# Patient Record
Sex: FEMALE | Race: OTHER | HISPANIC OR LATINO | Employment: FULL TIME | URBAN - METROPOLITAN AREA
[De-identification: names, ages, dates, MRNs, and addresses within clinical notes are randomized per-mention and may not be internally consistent; named-entity substitution may affect disease eponyms.]

---

## 2020-03-11 ENCOUNTER — HOSPITAL ENCOUNTER (EMERGENCY)
Facility: HOSPITAL | Age: 40
Discharge: HOME | End: 2020-03-11
Attending: EMERGENCY MEDICINE | Admitting: EMERGENCY MEDICINE
Payer: COMMERCIAL

## 2020-03-11 VITALS
RESPIRATION RATE: 17 BRPM | OXYGEN SATURATION: 99 % | HEART RATE: 89 BPM | SYSTOLIC BLOOD PRESSURE: 147 MMHG | WEIGHT: 195 LBS | TEMPERATURE: 98.5 F | DIASTOLIC BLOOD PRESSURE: 91 MMHG

## 2020-03-11 DIAGNOSIS — J11.1 INFLUENZA: Primary | ICD-10-CM

## 2020-03-11 LAB
FLUAV RNA NPH QL NAA+PROBE: ABNORMAL
FLUBV RNA NPH QL NAA+PROBE: DETECTED
RSV RNA NPH QL NAA+PROBE: ABNORMAL
S PYO DNA THROAT QL NAA+PROBE: NORMAL

## 2020-03-11 PROCEDURE — 87631 RESP VIRUS 3-5 TARGETS: CPT | Performed by: PHYSICIAN ASSISTANT

## 2020-03-11 PROCEDURE — 99284 EMERGENCY DEPT VISIT MOD MDM: CPT | Performed by: PHYSICIAN ASSISTANT

## 2020-03-11 PROCEDURE — 99283 EMERGENCY DEPT VISIT LOW MDM: CPT

## 2020-03-11 PROCEDURE — 87651 STREP A DNA AMP PROBE: CPT | Performed by: PHYSICIAN ASSISTANT

## 2020-03-11 RX ORDER — IBUPROFEN 400 MG/1
400 TABLET ORAL EVERY 6 HOURS PRN
Qty: 20 TABLET | Refills: 0 | Status: SHIPPED | OUTPATIENT
Start: 2020-03-11

## 2020-03-11 RX ORDER — ACETAMINOPHEN 500 MG
500 TABLET ORAL EVERY 6 HOURS PRN
Qty: 30 TABLET | Refills: 0 | Status: SHIPPED | OUTPATIENT
Start: 2020-03-11

## 2020-03-11 RX ORDER — GUAIFENESIN/DEXTROMETHORPHAN 100-10MG/5
5 SYRUP ORAL 3 TIMES DAILY PRN
Qty: 118 ML | Refills: 0 | Status: SHIPPED | OUTPATIENT
Start: 2020-03-11 | End: 2020-03-21

## 2020-03-11 RX ORDER — LORATADINE 10 MG/1
10 TABLET ORAL DAILY
Qty: 30 TABLET | Refills: 0 | Status: SHIPPED | OUTPATIENT
Start: 2020-03-11

## 2020-03-11 NOTE — ED PROVIDER NOTES
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"  56-year-old female reports she is a  reports she began with nasal congestion, nonproductive cough and subjective fevers and body aches on Sunday, 4 days ago  Patient reports she did not receive the vaccine however denies any other medical conditions and reports she is not a smoker  Patient reports she was told she had to get checked for any viruses which she might past children  Patient reports no travel outside the country  Patient denies chest pain, abdominal pain, nausea, vomiting, diarrhea, shortness of breath, dysuria, hematuria or flank pain, diarrhea, constipation  History provided by:  Patient   used: No    Flu Symptoms   Presenting symptoms: cough, fatigue, headache, myalgias, rhinorrhea and sore throat    Presenting symptoms: no diarrhea, no fever, no nausea, no shortness of breath and no vomiting    Severity:  Moderate  Onset quality:  Gradual  Duration:  4 days  Progression:  Unchanged  Chronicity:  New  Relieved by:  None tried  Worsened by:  Nothing  Ineffective treatments:  None tried  Associated symptoms: chills, decreased appetite and nasal congestion    Associated symptoms: no decrease in physical activity, no ear pain, no mental status change, no neck stiffness and no syncope    Risk factors: sick contacts        None       History reviewed  No pertinent past medical history  History reviewed  No pertinent surgical history  History reviewed  No pertinent family history  I have reviewed and agree with the history as documented      E-Cigarette/Vaping     E-Cigarette/Vaping Substances     Social History     Tobacco Use    Smoking status: Never Smoker    Smokeless tobacco: Never Used   Substance Use Topics    Alcohol use: Never Frequency: Never    Drug use: Never       Review of Systems   Constitutional: Positive for chills, decreased appetite and fatigue  Negative for fever  HENT: Positive for congestion, rhinorrhea and sore throat  Negative for ear pain  Eyes: Negative for redness  Respiratory: Positive for cough  Negative for chest tightness and shortness of breath  Cardiovascular: Negative for chest pain and palpitations  Gastrointestinal: Negative for abdominal pain, diarrhea, nausea and vomiting  Genitourinary: Negative for dysuria and hematuria  Musculoskeletal: Positive for myalgias  Negative for neck stiffness  Skin: Negative for rash  Neurological: Positive for headaches  Negative for dizziness, syncope, light-headedness and numbness  Physical Exam  Physical Exam   Constitutional: She is oriented to person, place, and time  She appears well-developed and well-nourished  HENT:   Head: Normocephalic  Eyes: No scleral icterus  Cardiovascular: Normal rate and regular rhythm  Pulmonary/Chest: Effort normal and breath sounds normal  No stridor  Patient in no respiratory distress, speaking in full sentences, managing oral secretions without difficulty, no accessory muscle use, retractions, or belly breathing noted, no adventitious lung sounds auscultated bilaterally  Abdominal: Soft  She exhibits no distension  There is no tenderness  Musculoskeletal: Normal range of motion  Neurological: She is alert and oriented to person, place, and time  Skin: Skin is warm and dry  Capillary refill takes less than 2 seconds  Psychiatric: She has a normal mood and affect  Nursing note and vitals reviewed        Vital Signs  ED Triage Vitals [03/11/20 1824]   Temperature Pulse Respirations Blood Pressure SpO2   98 5 °F (36 9 °C) 89 17 147/91 99 %      Temp Source Heart Rate Source Patient Position - Orthostatic VS BP Location FiO2 (%)   Tympanic Monitor Sitting Left arm --      Pain Score       -- Vitals:    03/11/20 1824   BP: 147/91   Pulse: 89   Patient Position - Orthostatic VS: Sitting         Visual Acuity      ED Medications  Medications - No data to display    Diagnostic Studies  Results Reviewed     Procedure Component Value Units Date/Time    Influenza A/B and RSV PCR [[id number]]  (Abnormal) Collected:  03/11/20 1904    Lab Status:  Final result Specimen:  Nasopharyngeal from Nose Updated:  03/11/20 1956     INFLUENZA A PCR None Detected     INFLUENZA B PCR Detected     RSV PCR None Detected    Strep A PCR [[id number]]  (Normal) Collected:  03/11/20 1904    Lab Status:  Final result Specimen:  Throat Updated:  03/11/20 1951     STREP A PCR None Detected                 No orders to display              Procedures  Procedures         ED Course  ED Course as of Mar 11 2008   Wed Mar 11, 2020   2007 Patient was reexamined at this time and informed of laboratory and/or imaging results and was found to be stable for discharge  Return to emergency department criteria was reviewed with the patient who verbalized understanding and was agreeable to discharge and the treatment plan at this time  INFLU B PCR(!): Detected                                 MDM  Number of Diagnoses or Management Options  Diagnosis management comments: Previously healthy 43-year-old female on vaccinated for influenza presents for evaluation of a nonproductive cough, body aches, headaches, nasal congestion which began Sunday,4 days ago  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  and was told she needed to be tested for communicable diseases before returning to work  Will obtain a strep and influenza testing  Patient is very well-appearing on exam likely will only need symptomatic treatment including Tylenol, Claritin and cough suppressants          Disposition  Final diagnoses:   Influenza     Time reflects when diagnosis was documented in both MDM as applicable and the Disposition within this note     Time User Action Codes Description Comment    3/11/2020  8:07 PM Katina Pérez - Selena Kaplan [J11 1] Influenza       ED Disposition     ED Disposition Condition Date/Time Comment    Discharge Stable Wed Mar 11, 2020  8:07 PM Vandana Kirsty discharge to home/self care  Follow-up Information     Follow up With Specialties Details Why Deepika Alonso MD Family Medicine Schedule an appointment as soon as possible for a visit  As needed [address] [facility]             Patient's Medications   Discharge Prescriptions    ACETAMINOPHEN (TYLENOL) 500 MG TABLET    Take 1 tablet (500 mg total) by mouth every 6 (six) hours as needed for moderate pain       Start Date: 3/11/2020 End Date: --       Order Dose: 500 mg       Quantity: 30 tablet    Refills: 0    DEXTROMETHORPHAN-GUAIFENESIN (ROBITUSSIN DM)  MG/5 ML SYRUP    Take 5 mL by mouth 3 (three) times a day as needed for cough for up to 10 days       Start Date: 3/11/2020 End Date: 3/21/2020       Order Dose: 5 mL       Quantity: 118 mL    Refills: 0    IBUPROFEN (MOTRIN) 400 MG TABLET    Take 1 tablet (400 mg total) by mouth every 6 (six) hours as needed for moderate pain       Start Date: 3/11/2020 End Date: --       Order Dose: 400 mg       Quantity: 20 tablet    Refills: 0    LORATADINE (CLARITIN) 10 MG TABLET    Take 1 tablet (10 mg total) by mouth daily       Start Date: 3/11/2020 End Date: --       Order Dose: 10 mg       Quantity: 30 tablet    Refills: 0     No discharge procedures on file      PDMP Review     None          ED Provider  Electronically Signed by           Khang Rutledge PA-C  03/11/20 2008

## 2023-05-17 ENCOUNTER — OFFICE VISIT (OUTPATIENT)
Dept: FAMILY MEDICINE CLINIC | Facility: CLINIC | Age: 43
End: 2023-05-17

## 2023-05-17 VITALS
HEART RATE: 89 BPM | RESPIRATION RATE: 16 BRPM | SYSTOLIC BLOOD PRESSURE: 122 MMHG | OXYGEN SATURATION: 98 % | WEIGHT: 199 LBS | TEMPERATURE: 97.5 F | DIASTOLIC BLOOD PRESSURE: 78 MMHG | HEIGHT: 65 IN | BODY MASS INDEX: 33.15 KG/M2

## 2023-05-17 DIAGNOSIS — B35.1 ONYCHOMYCOSIS: ICD-10-CM

## 2023-05-17 DIAGNOSIS — L30.9 ECZEMA OF FACE: ICD-10-CM

## 2023-05-17 DIAGNOSIS — Z12.31 ENCOUNTER FOR SCREENING MAMMOGRAM FOR BREAST CANCER: ICD-10-CM

## 2023-05-17 DIAGNOSIS — Z76.89 ENCOUNTER TO ESTABLISH CARE: Primary | ICD-10-CM

## 2023-05-17 DIAGNOSIS — E78.5 HYPERLIPIDEMIA, UNSPECIFIED HYPERLIPIDEMIA TYPE: ICD-10-CM

## 2023-05-17 DIAGNOSIS — M25.50 ARTHRALGIA, UNSPECIFIED JOINT: ICD-10-CM

## 2023-05-17 DIAGNOSIS — Z11.4 SCREENING FOR HIV (HUMAN IMMUNODEFICIENCY VIRUS): ICD-10-CM

## 2023-05-17 DIAGNOSIS — Z11.59 NEED FOR HEPATITIS C SCREENING TEST: ICD-10-CM

## 2023-05-17 RX ORDER — GABAPENTIN 100 MG/1
100 CAPSULE ORAL 3 TIMES DAILY
Qty: 90 CAPSULE | Refills: 2 | Status: SHIPPED | OUTPATIENT
Start: 2023-05-17 | End: 2023-08-15

## 2023-05-17 RX ORDER — TRIAMCINOLONE ACETONIDE 0.25 MG/G
OINTMENT TOPICAL 2 TIMES DAILY
Qty: 30 G | Refills: 0 | Status: SHIPPED | OUTPATIENT
Start: 2023-05-17 | End: 2023-05-17

## 2023-05-17 RX ORDER — TRIAMCINOLONE ACETONIDE 1 MG/G
CREAM TOPICAL 2 TIMES DAILY
Qty: 30 G | Refills: 0 | Status: SHIPPED | OUTPATIENT
Start: 2023-05-17

## 2023-05-17 NOTE — PROGRESS NOTES
Name: Judith Rutledge      : 1980      MRN: [id number]  Encounter Provider: HAILE Foster  Encounter Date: 2023   Encounter department: [address] [place] [address]     1  Encounter to establish care    2  BMI 33 0-33 9,adult  -     CBC and differential; Future  -     Comprehensive metabolic panel; Future  -     Hemoglobin A1C; Future  -     Lipid panel; Future  -     TSH, 3rd generation with Free T4 reflex; Future  -     Ambulatory Referral to Weight Management; Future    3  Arthralgia, unspecified joint  Assessment & Plan:  Patient reports daily multiple joint pain worse in the AM  Most bothersome in her blt knees with intermittent inflammation  Exam is unremarkable -discussed with patient will obtain inflammatory marker labs r/o autoimmune etiology if initial test are positive will refer to rheumatology   Discussed weight loss benefits   Start gabapentin 100 mg tid, Diclofenac 50 mg bid     Orders:  -     RF Screen w/ Reflex to Titer; Future  -     Cyclic citrul peptide antibody, IgG; Future  -     SIMÓN Screen w/ Reflex to Titer/Pattern; Future  -     C-reactive protein; Future  -     gabapentin (Neurontin) 100 mg capsule; Take 1 capsule (100 mg total) by mouth 3 (three) times a day  -     diclofenac sodium (VOLTAREN) 50 mg EC tablet; Take 1 tablet (50 mg total) by mouth 2 (two) times a day  -     XR knee 3 vw left non injury; Future; Expected date: 2023  -     XR knee 3 vw right non injury; Future; Expected date: 2023    4  Need for hepatitis C screening test  -     Hepatitis C Antibody; Future    5  Screening for HIV (human immunodeficiency virus)  -     HIV 1/2 AG/AB w Reflex [facility] for 2 yr old and above; Future    6  Encounter for screening mammogram for breast cancer  -     Mammo screening bilateral w 3d & cad; Future; Expected date: 2023    7   Eczema of face  Assessment & Plan:  Reports dry scaly pruritic rash on face for a few weeks - no change in soap/lotion   -Advised patient to continue applying OTC lotion such as aveeno or eucerin twice daily and also immediately after showers  - Advised to take fast showers with warm water (not hot)  Advised to pat skin dry rather than rub  Advised to use [place], [place], or BASIS soap products or anything without scents  - 1  Fast showers with warm not hot water  pat dry not rub   2  Apply good moisturizer immediately afer showers and bid : I like [employer] Ultimate products : cheap and good   4  Dove? [place] or BASIS soap products are best : nothing with scent or deodorants   5 No fabric softenenrs     Start triamcinolone bid for 7 days     Orders:  -     triamcinolone (KENALOG) 0 1 % cream; Apply topically 2 (two) times a day    8  Onychomycosis  Assessment & Plan:  Patient reports ongoing nail discoloration differential fungal in origin   Start penlac 8% nightly    Orders:  -     ciclopirox (PENLAC) 8 % solution; Apply topically daily at bedtime    9  Hyperlipidemia, unspecified hyperlipidemia type  Assessment & Plan:  History or hyperlipedemia   - Advised patient to exercisefor at least 30 minutes a day for at least 5 days a week  - Advised patient to follow a low-carbohydrate diet and to eat carbohydrates that are whole grain     - Advised to try to avoid/limit the daily intake of unhealthy fat such as vegetable oil, canola oil, and peanut oil  You can often find these oils within foods such as cookies, cakes, chips  - Advised to increase foods with healthy fats such as avocados, beans, legumes, nuts, tuna, salmon, whole grains  Lipid future       BMI Counseling: Body mass index is 33 12 kg/m²   The BMI is above normal  Nutrition recommendations include decreasing portion sizes, encouraging healthy choices of fruits and vegetables, decreasing fast food intake, consuming healthier snacks, limiting drinks that contain sugar, moderation in carbohydrate intake, increasing intake of lean protein, reducing intake of saturated and trans fat and reducing intake of cholesterol  Exercise recommendations include exercising 3-5 times per week  Pharmacotherapy was ordered to help aid in weight loss  Patient referred to weight management  Rationale for BMI follow-up plan is due to patient being overweight or obese  Depression Screening and Follow-up Plan: Patient was screened for depression during today's encounter  They screened negative with a PHQ-2 score of 0  Amarilys Paiz 39 y o  female  has no past medical history on file  Presenting today to [facility]  Patient experiencing multiple joint pain in blt hands, fingers, knees, ankles, back for over 4 months  Patient today is well appearing Denies fatigue, headaches, dizziness, blurred vision, nausea, palpitation, chest pain, SOB, urinary changes, weakness, bowel changes, sleep problems,  sick contacts, red flag signs,  or recent travel             Generalized Body Aches  The current episode started more than 1 month ago  The problem occurs daily  The problem has been waxing and waning since onset  Exacerbated by: lying at night  Associated symptoms include joint pain and a rash  Pertinent negatives include no congestion, ear pain, eye pain, sore throat, fever, chest pain, coughing, shortness of breath, abdominal pain or vomiting  Past treatments include acetaminophen (NSAIDS)  Urine output has been normal  There were no sick contacts  Rash  This is a new problem  The current episode started 1 to 4 weeks ago  The problem is unchanged  The affected locations include the face  The problem is mild  The rash is characterized by dryness, scaling and itchiness  It is unknown if there was an exposure to a precipitant  Associated symptoms include joint pain  Pertinent negatives include no congestion, cough, decreased physical activity, decreased responsiveness, fever, shortness of breath, sore throat or vomiting   Past treatments include "nothing  The treatment provided no relief  Review of Systems   Constitutional: Negative for chills, decreased responsiveness and fever  HENT: Negative for congestion, ear pain and sore throat  Eyes: Negative for pain and visual disturbance  Respiratory: Negative for cough and shortness of breath  Cardiovascular: Negative for chest pain and palpitations  Gastrointestinal: Negative for abdominal pain and vomiting  Genitourinary: Negative for dysuria and hematuria  Musculoskeletal: Positive for arthralgias and joint pain  Negative for back pain  Skin: Positive for rash  Negative for color change  Neurological: Negative for seizures and syncope  All other systems reviewed and are negative  Current Outpatient Medications on File Prior to Visit   Medication Sig   • [DISCONTINUED] acetaminophen (TYLENOL) 500 mg tablet Take 1 tablet (500 mg total) by mouth every 6 (six) hours as needed for moderate pain (Patient not taking: Reported on 5/17/2023)   • [DISCONTINUED] ibuprofen (MOTRIN) 400 mg tablet Take 1 tablet (400 mg total) by mouth every 6 (six) hours as needed for moderate pain (Patient not taking: Reported on 5/17/2023)   • [DISCONTINUED] loratadine (CLARITIN) 10 mg tablet Take 1 tablet (10 mg total) by mouth daily (Patient not taking: Reported on 5/17/2023)       Objective     /78 (BP Location: Left arm, Patient Position: Sitting, Cuff Size: Large)   Pulse 89   Temp 97 5 °F (36 4 °C) (Temporal)   Resp 16   Ht 5' 5\" (1 651 m)   Wt 90 3 kg (199 lb)   LMP 04/30/2023 (Approximate)   SpO2 98%   BMI 33 12 kg/m²     Physical Exam  Vitals and nursing note reviewed  Constitutional:       General: She is not in acute distress  Appearance: Normal appearance  She is not ill-appearing  HENT:      Head: Normocephalic and atraumatic        Right Ear: Tympanic membrane, ear canal and external ear normal       Left Ear: Tympanic membrane, ear canal and external ear normal       Nose: " Nose normal       Mouth/Throat:      Mouth: Mucous membranes are moist    Eyes:      General:         Right eye: No discharge  Left eye: No discharge  Pupils: Pupils are equal, round, and reactive to light  Cardiovascular:      Rate and Rhythm: Normal rate and regular rhythm  Pulses: Normal pulses  Heart sounds: Normal heart sounds  Pulmonary:      Effort: Pulmonary effort is normal  No respiratory distress  Breath sounds: Normal breath sounds  No wheezing  Abdominal:      General: Bowel sounds are normal       Palpations: Abdomen is soft  Tenderness: There is no abdominal tenderness  There is no right CVA tenderness or left CVA tenderness  Musculoskeletal:         General: Normal range of motion  Cervical back: Normal range of motion  Right lower leg: No edema  Left lower leg: No edema  Skin:     General: Skin is warm and dry  Findings: Rash present  Rash is scaling  Neurological:      General: No focal deficit present  Mental Status: She is alert and oriented to person, place, and time         HAILE Yung

## 2023-05-17 NOTE — ASSESSMENT & PLAN NOTE
Patient reports daily multiple joint pain worse in the AM  Most bothersome in her blt knees with intermittent inflammation     Exam is unremarkable -discussed with patient will obtain inflammatory marker labs r/o autoimmune etiology if initial test are positive will refer to rheumatology   Discussed weight loss benefits   Start gabapentin 100 mg tid, Diclofenac 50 mg bid

## 2023-05-17 NOTE — ASSESSMENT & PLAN NOTE
History or hyperlipedemia   - Advised patient to exercisefor at least 30 minutes a day for at least 5 days a week  - Advised patient to follow a low-carbohydrate diet and to eat carbohydrates that are whole grain     - Advised to try to avoid/limit the daily intake of unhealthy fat such as vegetable oil, canola oil, and peanut oil  You can often find these oils within foods such as cookies, cakes, chips  - Advised to increase foods with healthy fats such as avocados, beans, legumes, nuts, tuna, salmon, whole grains      Lipid future

## 2023-05-17 NOTE — ASSESSMENT & PLAN NOTE
Reports dry scaly pruritic rash on face for a few weeks - no change in soap/lotion   -Advised patient to continue applying OTC lotion such as aveeno or eucerin twice daily and also immediately after showers  - Advised to take fast showers with warm water (not hot)  Advised to pat skin dry rather than rub  Advised to use [place], [place], or [employer] soap products or anything without scents  - 1  Fast showers with warm not hot water  pat dry not rub   2  Apply good moisturizer immediately afer showers and bid : I like [employer] Ultimate products : cheap and good   4  Dove?  [place] or BASIS soap products are best : nothing with scent or deodorants   5 No fabric softenenrs     Start triamcinolone bid for 7 days

## 2023-05-18 ENCOUNTER — OFFICE VISIT (OUTPATIENT)
Dept: OBGYN CLINIC | Facility: CLINIC | Age: 43
End: 2023-05-18

## 2023-05-18 ENCOUNTER — APPOINTMENT (OUTPATIENT)
Dept: LAB | Facility: CLINIC | Age: 43
End: 2023-05-18

## 2023-05-18 VITALS
WEIGHT: 197.4 LBS | HEIGHT: 65 IN | HEART RATE: 73 BPM | DIASTOLIC BLOOD PRESSURE: 73 MMHG | BODY MASS INDEX: 32.89 KG/M2 | SYSTOLIC BLOOD PRESSURE: 134 MMHG

## 2023-05-18 DIAGNOSIS — M25.50 ARTHRALGIA, UNSPECIFIED JOINT: ICD-10-CM

## 2023-05-18 DIAGNOSIS — Z01.419 ENCOUNTER FOR ANNUAL ROUTINE GYNECOLOGICAL EXAMINATION: Primary | ICD-10-CM

## 2023-05-18 DIAGNOSIS — Z11.59 NEED FOR HEPATITIS C SCREENING TEST: ICD-10-CM

## 2023-05-18 DIAGNOSIS — Z11.4 SCREENING FOR HIV (HUMAN IMMUNODEFICIENCY VIRUS): ICD-10-CM

## 2023-05-18 LAB
ALBUMIN SERPL BCP-MCNC: 3.8 G/DL (ref 3.5–5)
ALP SERPL-CCNC: 70 U/L (ref 46–116)
ALT SERPL W P-5'-P-CCNC: 22 U/L (ref 12–78)
ANA SER QL IA: NEGATIVE
ANION GAP SERPL CALCULATED.3IONS-SCNC: 2 MMOL/L (ref 4–13)
AST SERPL W P-5'-P-CCNC: 18 U/L (ref 5–45)
BASOPHILS # BLD AUTO: 0.03 THOUSANDS/ÂΜL (ref 0–0.1)
BASOPHILS NFR BLD AUTO: 1 % (ref 0–1)
BILIRUB SERPL-MCNC: 0.42 MG/DL (ref 0.2–1)
BUN SERPL-MCNC: 20 MG/DL (ref 5–25)
CALCIUM SERPL-MCNC: 8.9 MG/DL (ref 8.3–10.1)
CHLORIDE SERPL-SCNC: 111 MMOL/L (ref 96–108)
CHOLEST SERPL-MCNC: 242 MG/DL
CO2 SERPL-SCNC: 23 MMOL/L (ref 21–32)
CREAT SERPL-MCNC: 0.82 MG/DL (ref 0.6–1.3)
CRP SERPL QL: 11.6 MG/L
EOSINOPHIL # BLD AUTO: 0.11 THOUSAND/ÂΜL (ref 0–0.61)
EOSINOPHIL NFR BLD AUTO: 2 % (ref 0–6)
ERYTHROCYTE [DISTWIDTH] IN BLOOD BY AUTOMATED COUNT: 12.7 % (ref 11.6–15.1)
GFR SERPL CREATININE-BSD FRML MDRD: 87 ML/MIN/1.73SQ M
GLUCOSE P FAST SERPL-MCNC: 85 MG/DL (ref 65–99)
HCT VFR BLD AUTO: 39 % (ref 34.8–46.1)
HCV AB SER QL: NORMAL
HDLC SERPL-MCNC: 45 MG/DL
HGB BLD-MCNC: 12.8 G/DL (ref 11.5–15.4)
HIV 1+2 AB+HIV1 P24 AG SERPL QL IA: NORMAL
HIV 2 AB SERPL QL IA: NORMAL
HIV1 AB SERPL QL IA: NORMAL
HIV1 P24 AG SERPL QL IA: NORMAL
IMM GRANULOCYTES # BLD AUTO: 0.01 THOUSAND/UL (ref 0–0.2)
IMM GRANULOCYTES NFR BLD AUTO: 0 % (ref 0–2)
LDLC SERPL CALC-MCNC: 152 MG/DL (ref 0–100)
LYMPHOCYTES # BLD AUTO: 1.98 THOUSANDS/ÂΜL (ref 0.6–4.47)
LYMPHOCYTES NFR BLD AUTO: 37 % (ref 14–44)
MCH RBC QN AUTO: 30.4 PG (ref 26.8–34.3)
MCHC RBC AUTO-ENTMCNC: 32.8 G/DL (ref 31.4–37.4)
MCV RBC AUTO: 93 FL (ref 82–98)
MONOCYTES # BLD AUTO: 0.48 THOUSAND/ÂΜL (ref 0.17–1.22)
MONOCYTES NFR BLD AUTO: 9 % (ref 4–12)
NEUTROPHILS # BLD AUTO: 2.7 THOUSANDS/ÂΜL (ref 1.85–7.62)
NEUTS SEG NFR BLD AUTO: 51 % (ref 43–75)
NONHDLC SERPL-MCNC: 197 MG/DL
NRBC BLD AUTO-RTO: 0 /100 WBCS
PLATELET # BLD AUTO: 232 THOUSANDS/UL (ref 149–390)
PMV BLD AUTO: 12 FL (ref 8.9–12.7)
POTASSIUM SERPL-SCNC: 4.1 MMOL/L (ref 3.5–5.3)
PROT SERPL-MCNC: 7.7 G/DL (ref 6.4–8.4)
RBC # BLD AUTO: 4.21 MILLION/UL (ref 3.81–5.12)
SODIUM SERPL-SCNC: 136 MMOL/L (ref 135–147)
TRIGL SERPL-MCNC: 227 MG/DL
TSH SERPL DL<=0.05 MIU/L-ACNC: 3.41 UIU/ML (ref 0.45–4.5)
WBC # BLD AUTO: 5.31 THOUSAND/UL (ref 4.31–10.16)

## 2023-05-18 RX ORDER — UREA 10 %
1 LOTION (ML) TOPICAL DAILY
COMMUNITY
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
## 2023-05-18 NOTE — PATIENT INSTRUCTIONS
PAP results can take up to 2 weeks  Call with needs or concerns  Return in 1 year      Tereza Montero

## 2023-05-19 ENCOUNTER — HOSPITAL ENCOUNTER (OUTPATIENT)
Dept: RADIOLOGY | Facility: HOSPITAL | Age: 43
End: 2023-05-19

## 2023-05-19 DIAGNOSIS — M25.50 ARTHRALGIA, UNSPECIFIED JOINT: ICD-10-CM

## 2023-05-19 LAB
CCP AB SER IA-ACNC: 0.8
EST. AVERAGE GLUCOSE BLD GHB EST-MCNC: 103 MG/DL
HBA1C MFR BLD: 5.2 %
RHEUMATOID FACT SER QL LA: NEGATIVE

## 2023-05-22 LAB
HPV HR 12 DNA CVX QL NAA+PROBE: NEGATIVE
HPV16 DNA CVX QL NAA+PROBE: NEGATIVE
HPV18 DNA CVX QL NAA+PROBE: NEGATIVE

## 2023-05-22 NOTE — RESULT ENCOUNTER NOTE
Mild elevation of CRP (shows inflammation) neg for autoimmune etiology   CBC -normal no signs of anemia   CMP-Liver/kidney function normal   Thyroid -normal   A1C(diabetes) -5 2 normal   Cholesterol -elevated   - Advised patient to exercisefor at least 30 minutes a day for at least 5 days a week     - Advised patient to follow a low-carbohydrate diet and to eat carbohydrates that are whole grain     - Advised to try to avoid/limit the daily intake of unhealthy fat such as vegetable oil, canola oil, and peanut oil  You can often find these oils within foods such as cookies, cakes, chips  - Advised to increase foods with healthy fats such as avocados, beans, legumes, nuts, tuna, salmon, whole grains  ;

## 2023-05-26 LAB
LAB AP GYN PRIMARY INTERPRETATION: NORMAL
Lab: NORMAL

## 2023-06-01 ENCOUNTER — HOSPITAL ENCOUNTER (OUTPATIENT)
Dept: MAMMOGRAPHY | Facility: CLINIC | Age: 43
End: 2023-06-01
Payer: COMMERCIAL

## 2023-06-01 VITALS — WEIGHT: 197 LBS | BODY MASS INDEX: 32.82 KG/M2 | HEIGHT: 65 IN

## 2023-06-01 DIAGNOSIS — Z12.31 ENCOUNTER FOR SCREENING MAMMOGRAM FOR BREAST CANCER: ICD-10-CM

## 2023-06-01 PROCEDURE — 77063 BREAST TOMOSYNTHESIS BI: CPT

## 2023-06-01 PROCEDURE — 77067 SCR MAMMO BI INCL CAD: CPT

## 2023-08-15 ENCOUNTER — TELEPHONE (OUTPATIENT)
Dept: FAMILY MEDICINE CLINIC | Facility: CLINIC | Age: 43
End: 2023-08-15

## 2023-08-15 NOTE — TELEPHONE ENCOUNTER
left message for pt to call back to reschedule the 07/5 appt ( Provider ) also advise appt would be canceled - will try again one more time

## 2023-08-17 ENCOUNTER — OFFICE VISIT (OUTPATIENT)
Dept: FAMILY MEDICINE CLINIC | Facility: CLINIC | Age: 43
End: 2023-08-17

## 2023-08-17 VITALS
DIASTOLIC BLOOD PRESSURE: 70 MMHG | HEIGHT: 65 IN | TEMPERATURE: 98.5 F | SYSTOLIC BLOOD PRESSURE: 120 MMHG | OXYGEN SATURATION: 97 % | RESPIRATION RATE: 16 BRPM | HEART RATE: 97 BPM | BODY MASS INDEX: 33.66 KG/M2 | WEIGHT: 202 LBS

## 2023-08-17 DIAGNOSIS — L30.9 ECZEMA OF FACE: ICD-10-CM

## 2023-08-17 DIAGNOSIS — B35.1 ONYCHOMYCOSIS: ICD-10-CM

## 2023-08-17 DIAGNOSIS — M25.50 ARTHRALGIA, UNSPECIFIED JOINT: ICD-10-CM

## 2023-08-17 DIAGNOSIS — N39.46 MIXED STRESS AND URGE INCONTINENCE: Primary | ICD-10-CM

## 2023-08-17 PROCEDURE — 99213 OFFICE O/P EST LOW 20 MIN: CPT

## 2023-08-17 NOTE — ASSESSMENT & PLAN NOTE
- Recommend kegel exercises & lifestyle management. Declined pelvic therapy. F/u obgyn if no improvement.

## 2023-08-17 NOTE — PROGRESS NOTES
Name: Carissa Sin      : 1980      MRN: [id number]  Encounter Provider: HAILE Sanchez  Encounter Date: 2023   Encounter department: [address] [facility]     1. Mixed stress and urge incontinence  Assessment & Plan:  - Recommend kegel exercises & lifestyle management. Declined pelvic therapy. F/u obgyn if no improvement. 2. Onychomycosis  Assessment & Plan:  - continue penlac. Advised that it can take up to 6 months to resolve      3. Eczema of face  Assessment & Plan:  - resolved, stopped kenalog      4. Arthralgia, unspecified joint  Assessment & Plan:  Resolved. Stopped gabapentin & diclofenac. Subjective      Carissa Sin is a 39 y.o. female  has a past medical history of Abnormal Pap smear of cervix. has a past surgical history that includes Tubal ligation. Carissa Sin is a 39 y.o. female who presents for evaluation of urinary incontinence. This has been present for 2 months. She leaks urine with coughing, laughing, sneezing, with urge. Patient describes the symptoms as urge to urinate with little or no warning. Factors associated with symptoms include: none known. Evaluation to date includes none. Treatment to date includes none. Review of Systems   Constitutional: Negative for chills, fatigue, fever and unexpected weight change. HENT: Negative for congestion, ear pain and sore throat. Eyes: Negative for pain and visual disturbance. Respiratory: Negative for cough, chest tightness and shortness of breath. Cardiovascular: Negative for chest pain and palpitations. Gastrointestinal: Negative for abdominal pain, constipation, diarrhea, nausea and vomiting. Genitourinary: Positive for urgency. Negative for dysuria and hematuria. Urinary incontinence   Musculoskeletal: Negative for arthralgias and myalgias. Skin: Negative for color change and rash.    Neurological: Negative for dizziness, light-headedness and headaches. Psychiatric/Behavioral: Negative for confusion. Current Outpatient Medications on File Prior to Visit   Medication Sig   • calcium carbonate (OS-BEATRICE) 1250 (500 Ca) MG chewable tablet Chew 1 tablet daily (Patient not taking: Reported on 8/17/2023)   • ciclopirox (PENLAC) 8 % solution Apply topically daily at bedtime (Patient not taking: Reported on 8/17/2023)   • [DISCONTINUED] diclofenac sodium (VOLTAREN) 50 mg EC tablet Take 1 tablet (50 mg total) by mouth 2 (two) times a day   • [DISCONTINUED] gabapentin (Neurontin) 100 mg capsule Take 1 capsule (100 mg total) by mouth 3 (three) times a day   • [DISCONTINUED] triamcinolone (KENALOG) 0.1 % cream Apply topically 2 (two) times a day (Patient not taking: Reported on 8/17/2023)       Objective     /70 (BP Location: Right arm, Patient Position: Sitting, Cuff Size: Large)   Pulse 97   Temp 98.5 °F (36.9 °C) (Temporal)   Resp 16   Ht 5' 5" (1.651 m)   Wt 91.6 kg (202 lb)   LMP 07/22/2023 (Approximate)   SpO2 97%   Breastfeeding No   BMI 33.61 kg/m²     Physical Exam  Vitals reviewed. Constitutional:       Appearance: Normal appearance. She is obese. HENT:      Head: Normocephalic and atraumatic. Right Ear: External ear normal.      Left Ear: External ear normal.      Nose: Nose normal.      Mouth/Throat:      Mouth: Mucous membranes are moist.   Eyes:      Conjunctiva/sclera: Conjunctivae normal.   Cardiovascular:      Rate and Rhythm: Normal rate and regular rhythm. Heart sounds: Normal heart sounds. No murmur heard. No gallop. Pulmonary:      Effort: Pulmonary effort is normal.      Breath sounds: Normal breath sounds. Abdominal:      General: Abdomen is flat. Bowel sounds are normal.      Palpations: Abdomen is soft. Skin:     General: Skin is warm and dry. Neurological:      Mental Status: She is alert and oriented to person, place, and time.    Psychiatric:         Mood and Affect: Mood normal.         Behavior: Behavior normal.         Thought Content:  Thought content normal.         Judgment: Judgment normal.       Tatum Cantrell

## 2023-08-24 ENCOUNTER — CONSULT (OUTPATIENT)
Dept: BARIATRICS | Facility: CLINIC | Age: 43
End: 2023-08-24
Payer: COMMERCIAL

## 2023-08-24 VITALS
BODY MASS INDEX: 33.87 KG/M2 | DIASTOLIC BLOOD PRESSURE: 70 MMHG | SYSTOLIC BLOOD PRESSURE: 124 MMHG | HEART RATE: 85 BPM | OXYGEN SATURATION: 99 % | HEIGHT: 64 IN | TEMPERATURE: 98.2 F | WEIGHT: 198.4 LBS | RESPIRATION RATE: 20 BRPM

## 2023-08-24 DIAGNOSIS — E78.5 HYPERLIPIDEMIA, UNSPECIFIED HYPERLIPIDEMIA TYPE: ICD-10-CM

## 2023-08-24 DIAGNOSIS — R63.5 ABNORMAL WEIGHT GAIN: Primary | ICD-10-CM

## 2023-08-24 DIAGNOSIS — E66.9 CLASS 1 OBESITY: ICD-10-CM

## 2023-08-24 PROCEDURE — 99204 OFFICE O/P NEW MOD 45 MIN: CPT | Performed by: PHYSICIAN ASSISTANT

## 2023-08-24 RX ORDER — TOPIRAMATE 50 MG/1
TABLET, FILM COATED ORAL
Qty: 30 TABLET | Refills: 1 | Status: SHIPPED | OUTPATIENT
Start: 2023-08-24

## 2023-08-24 NOTE — ASSESSMENT & PLAN NOTE
-Discussed options of Healthy[facility]-Intensive Lifestyle Intervention Program, Very Low Calorie Diet-VLCD and Conservative Program and the role of weight loss medications.  -Initial weight loss goal of 5-10% weight loss for improved health  -Screening labs: reviewed CMP, Lipid panel, TSH, HgbA1c  -Patient is interested in pursuing conservative program  -Calorie goals, sample menu, portion size guidelines, and food logging reviewed with the patient.   -Patient has struggled in past with eliminating soda. Discussed importance of avoiding sugary sweetened beverages to achieve weight loss. Can trial Topamax. SE profiled reviewed.  Patient instructed to call office with questions or concerns while taking Topamax  -Denies hx glaucoma or kidney stones  -Medication agreement signed  -Contraception: Tubal ligation    Negative Stop-Bang

## 2023-08-24 NOTE — PATIENT INSTRUCTIONS
Goals: Food log (ie.) [email or web address],[email or web address] com,etc.   No sugary beverages. At least 64oz of water daily. Increase physical activity by 10 minutes daily.  Gradually increase physical activity to a goal of 5 days per week for 30 minutes of MODERATE intensity PLUS 2 days per week of FULL BODY resistance training  6783-8995 calories per day  5-10 servings of fruits and vegetables per day  80 grams of protein per day

## 2023-08-24 NOTE — PROGRESS NOTES
Assessment/Plan:    Class 1 obesity  -Discussed options of [facility]-Intensive Lifestyle Intervention Program, Very Low Calorie Diet-VLCD and Conservative Program and the role of weight loss medications.  -Initial weight loss goal of 5-10% weight loss for improved health  -Screening labs: reviewed CMP, Lipid panel, TSH, HgbA1c  -Patient is interested in pursuing conservative program  -Calorie goals, sample menu, portion size guidelines, and food logging reviewed with the patient.   -Patient has struggled in past with eliminating soda. Discussed importance of avoiding sugary sweetened beverages to achieve weight loss. Can trial Topamax. SE profiled reviewed. Patient instructed to call office with questions or concerns while taking Topamax  -Denies hx glaucoma or kidney stones  -Medication agreement signed  -Contraception: Tubal ligation    Negative Stop-Bang    Hyperlipidemia  -can improve with dietary changes and regular cardiovascular exercise    Goals:    Food log (ie.) [email or web address],etc.   No sugary beverages. At least 64oz of water daily. Increase physical activity by 10 minutes daily. Gradually increase physical activity to a goal of 5 days per week for 30 minutes of MODERATE intensity PLUS 2 days per week of FULL BODY resistance training  1491-7676 calories per day  5-10 servings of fruits and vegetables per day  80 grams of protein per day    Follow up in approximately 2 months with Non-Surgical Physician/Advanced Practitioner. Diagnoses and all orders for this visit:    Abnormal weight gain  Comments:  see plan under Class 1 obesity    Class 1 obesity  -     Ambulatory Referral to Weight Management  -     topiramate (Topamax) 50 MG tablet;  Take 1/2 tab po HS for 1 week and then increase to 1 tab po HS    Hyperlipidemia, unspecified hyperlipidemia type          Subjective:   Chief Complaint   Patient presents with   • Consult       Patient ID: Valerio Patelpiter Jay Jay Hillman  is a 39 y.o. female with excess weight/obesity here to pursue weight managment. Past Medical History:   Diagnosis Date   • Abnormal Pap smear of cervix          HPI:  Obesity/Excess Weight:  Severity: Mild  Onset:  Around 2014   Modifiers: reducing portion sizes, self created diets, Phentermine and Topamax combo but reports it made her very emotional and tearful (never took them individually)  Contributing factors: Poor Food Choices and Stress/Emotional Eating  Associated symptoms: fatigue and increased joint pain, doesn't feel comfortable    Goals: Lose 30-35 lbs  Highest: 200 lbs    Hydration: water 2-3 bottles, 1 cup of coffee + flavored creamer, Regular soda 2-4 cans of soda per day, iced tea  ETOH: denies  Exercise: denies  Occupation:   SLeep: 8 hours  Smoking: denies      Colonoscopy: N/A    The following portions of the patient's history were reviewed and updated as appropriate: allergies, current medications, past family history, past medical history, past social history, past surgical history and problem list.    Review of Systems   Constitutional: Negative for chills and fever. HENT: Negative for sore throat. Respiratory: Negative for cough and shortness of breath. Cardiovascular: Negative for chest pain and palpitations. Gastrointestinal: Negative for abdominal pain, nausea and vomiting. Genitourinary: Negative for dysuria. Musculoskeletal: Positive for arthralgias. Skin: Negative for rash. Neurological: Negative for dizziness and headaches. Psychiatric/Behavioral: Negative for dysphoric mood. Objective:    /70 (BP Location: Left arm, Patient Position: Sitting, Cuff Size: Standard)   Pulse 85   Temp 98.2 °F (36.8 °C)   Resp 20   Ht 5' 4" (1.626 m)   Wt 90 kg (198 lb 6.4 oz)   LMP 07/22/2023 (Approximate)   SpO2 99%   BMI 34.06 kg/m²     Physical Exam  Vitals and nursing note reviewed.      Constitutional   General appearance: Abnormal. well developed and obese. Eyes No conjunctival pallor. Ears, Nose, Mouth, and Throat Oral mucosa moist.   Pulmonary   Respiratory effort: No increased work of breathing or signs of respiratory distress. Auscultation of lungs: Clear to auscultation, equal breath sounds bilaterally, no wheezes, no rales, no rhonci. Cardiovascular   Auscultation of heart: Normal rate and rhythm, normal S1 and S2, without murmurs. Examination of extremities for edema and/or varicosities: Normal.  no edema. Abdomen   Abdomen: Abnormal.  The abdomen was obese. Bowel sounds were normal. The abdomen was soft and nontender.    Musculoskeletal   Gait and station: Normal.    Psychiatric   Orientation to person, place and time: Normal.    Affect: appropriate